# Patient Record
Sex: FEMALE | Race: WHITE | HISPANIC OR LATINO | URBAN - METROPOLITAN AREA
[De-identification: names, ages, dates, MRNs, and addresses within clinical notes are randomized per-mention and may not be internally consistent; named-entity substitution may affect disease eponyms.]

---

## 2021-08-23 ENCOUNTER — IMMUNIZATION (OUTPATIENT)
Dept: PRIMARY CARE CLINIC | Facility: CLINIC | Age: 46
End: 2021-08-23

## 2021-08-23 DIAGNOSIS — Z23 NEED FOR VACCINATION: Primary | ICD-10-CM

## 2021-08-23 PROCEDURE — 0001A COVID-19, MRNA, LNP-S, PF, 30 MCG/0.3 ML DOSE VACCINE: ICD-10-PCS | Mod: CV19,S$GLB,, | Performed by: FAMILY MEDICINE

## 2021-08-23 PROCEDURE — 0001A COVID-19, MRNA, LNP-S, PF, 30 MCG/0.3 ML DOSE VACCINE: CPT | Mod: CV19,S$GLB,, | Performed by: FAMILY MEDICINE

## 2021-08-23 PROCEDURE — 91300 COVID-19, MRNA, LNP-S, PF, 30 MCG/0.3 ML DOSE VACCINE: CPT | Mod: S$GLB,,, | Performed by: FAMILY MEDICINE

## 2021-08-23 PROCEDURE — 91300 COVID-19, MRNA, LNP-S, PF, 30 MCG/0.3 ML DOSE VACCINE: ICD-10-PCS | Mod: S$GLB,,, | Performed by: FAMILY MEDICINE

## 2021-09-13 ENCOUNTER — IMMUNIZATION (OUTPATIENT)
Dept: PRIMARY CARE CLINIC | Facility: CLINIC | Age: 46
End: 2021-09-13

## 2021-09-13 DIAGNOSIS — Z23 NEED FOR VACCINATION: Primary | ICD-10-CM

## 2021-09-13 PROCEDURE — 91300 COVID-19, MRNA, LNP-S, PF, 30 MCG/0.3 ML DOSE VACCINE: CPT | Mod: S$GLB,,, | Performed by: FAMILY MEDICINE

## 2021-09-13 PROCEDURE — 0002A COVID-19, MRNA, LNP-S, PF, 30 MCG/0.3 ML DOSE VACCINE: CPT | Mod: CV19,S$GLB,, | Performed by: FAMILY MEDICINE

## 2021-09-13 PROCEDURE — 0002A COVID-19, MRNA, LNP-S, PF, 30 MCG/0.3 ML DOSE VACCINE: ICD-10-PCS | Mod: CV19,S$GLB,, | Performed by: FAMILY MEDICINE

## 2021-09-13 PROCEDURE — 91300 COVID-19, MRNA, LNP-S, PF, 30 MCG/0.3 ML DOSE VACCINE: ICD-10-PCS | Mod: S$GLB,,, | Performed by: FAMILY MEDICINE

## 2025-06-20 ENCOUNTER — HOSPITAL ENCOUNTER (EMERGENCY)
Facility: HOSPITAL | Age: 50
Discharge: HOME | End: 2025-06-21
Attending: EMERGENCY MEDICINE

## 2025-06-20 DIAGNOSIS — K80.20 CALCULUS OF GALLBLADDER WITHOUT CHOLECYSTITIS WITHOUT OBSTRUCTION: ICD-10-CM

## 2025-06-20 DIAGNOSIS — K76.0 FATTY LIVER: Primary | ICD-10-CM

## 2025-06-20 DIAGNOSIS — R10.13 EPIGASTRIC ABDOMINAL PAIN: ICD-10-CM

## 2025-06-20 LAB
ABSOLUTE EOSINOPHIL (OHS): 0.21 K/UL
ABSOLUTE MONOCYTE (OHS): 0.41 K/UL (ref 0.3–1)
ABSOLUTE NEUTROPHIL COUNT (OHS): 8.47 K/UL (ref 1.8–7.7)
ALBUMIN SERPL BCP-MCNC: 3.6 G/DL (ref 3.5–5.2)
ALP SERPL-CCNC: 98 UNIT/L (ref 40–150)
ALT SERPL W/O P-5'-P-CCNC: 27 UNIT/L (ref 10–44)
ANION GAP (OHS): 15 MMOL/L (ref 8–16)
AST SERPL-CCNC: 39 UNIT/L (ref 11–45)
B-HCG UR QL: NEGATIVE
BASOPHILS # BLD AUTO: 0.04 K/UL
BASOPHILS NFR BLD AUTO: 0.4 %
BILIRUB SERPL-MCNC: 0.3 MG/DL (ref 0.1–1)
BILIRUB UR QL STRIP.AUTO: NEGATIVE
BUN SERPL-MCNC: 22 MG/DL (ref 6–20)
CALCIUM SERPL-MCNC: 9 MG/DL (ref 8.7–10.5)
CHLORIDE SERPL-SCNC: 101 MMOL/L (ref 95–110)
CLARITY UR: CLEAR
CO2 SERPL-SCNC: 20 MMOL/L (ref 23–29)
COLOR UR AUTO: YELLOW
CREAT SERPL-MCNC: 0.8 MG/DL (ref 0.5–1.4)
ERYTHROCYTE [DISTWIDTH] IN BLOOD BY AUTOMATED COUNT: 13.2 % (ref 11.5–14.5)
GFR SERPLBLD CREATININE-BSD FMLA CKD-EPI: >60 ML/MIN/1.73/M2
GLUCOSE SERPL-MCNC: 158 MG/DL (ref 70–110)
GLUCOSE UR QL STRIP: NEGATIVE
HCT VFR BLD AUTO: 40.9 % (ref 37–48.5)
HCV AB SERPL QL IA: NEGATIVE
HGB BLD-MCNC: 13.7 GM/DL (ref 12–16)
HGB UR QL STRIP: NEGATIVE
HIV 1+2 AB+HIV1 P24 AG SERPL QL IA: NEGATIVE
HOLD SPECIMEN: NORMAL
HOLD SPECIMEN: NORMAL
IMM GRANULOCYTES # BLD AUTO: 0.03 K/UL (ref 0–0.04)
IMM GRANULOCYTES NFR BLD AUTO: 0.3 % (ref 0–0.5)
KETONES UR QL STRIP: NEGATIVE
LEUKOCYTE ESTERASE UR QL STRIP: NEGATIVE
LIPASE SERPL-CCNC: 29 U/L (ref 4–60)
LYMPHOCYTES # BLD AUTO: 1.65 K/UL (ref 1–4.8)
MCH RBC QN AUTO: 30.2 PG (ref 27–31)
MCHC RBC AUTO-ENTMCNC: 33.5 G/DL (ref 32–36)
MCV RBC AUTO: 90 FL (ref 82–98)
NITRITE UR QL STRIP: NEGATIVE
NUCLEATED RBC (/100WBC) (OHS): 0 /100 WBC
PH UR STRIP: 7 [PH]
PLATELET # BLD AUTO: 238 K/UL (ref 150–450)
PMV BLD AUTO: 11 FL (ref 9.2–12.9)
POTASSIUM SERPL-SCNC: 4.2 MMOL/L (ref 3.5–5.1)
PROT SERPL-MCNC: 8.1 GM/DL (ref 6–8.4)
PROT UR QL STRIP: ABNORMAL
RBC # BLD AUTO: 4.53 M/UL (ref 4–5.4)
RELATIVE EOSINOPHIL (OHS): 1.9 %
RELATIVE LYMPHOCYTE (OHS): 15.3 % (ref 18–48)
RELATIVE MONOCYTE (OHS): 3.8 % (ref 4–15)
RELATIVE NEUTROPHIL (OHS): 78.3 % (ref 38–73)
SODIUM SERPL-SCNC: 136 MMOL/L (ref 136–145)
SP GR UR STRIP: 1.03
UROBILINOGEN UR STRIP-ACNC: NEGATIVE EU/DL
WBC # BLD AUTO: 10.81 K/UL (ref 3.9–12.7)

## 2025-06-20 PROCEDURE — 87389 HIV-1 AG W/HIV-1&-2 AB AG IA: CPT | Performed by: EMERGENCY MEDICINE

## 2025-06-20 PROCEDURE — 83690 ASSAY OF LIPASE: CPT | Performed by: NURSE PRACTITIONER

## 2025-06-20 PROCEDURE — 86803 HEPATITIS C AB TEST: CPT | Performed by: EMERGENCY MEDICINE

## 2025-06-20 PROCEDURE — 96375 TX/PRO/DX INJ NEW DRUG ADDON: CPT

## 2025-06-20 PROCEDURE — 99285 EMERGENCY DEPT VISIT HI MDM: CPT | Mod: 25

## 2025-06-20 PROCEDURE — 81025 URINE PREGNANCY TEST: CPT | Performed by: NURSE PRACTITIONER

## 2025-06-20 PROCEDURE — 96374 THER/PROPH/DIAG INJ IV PUSH: CPT

## 2025-06-20 PROCEDURE — 63600175 PHARM REV CODE 636 W HCPCS: Performed by: NURSE PRACTITIONER

## 2025-06-20 PROCEDURE — 93005 ELECTROCARDIOGRAM TRACING: CPT

## 2025-06-20 PROCEDURE — 85025 COMPLETE CBC W/AUTO DIFF WBC: CPT | Performed by: NURSE PRACTITIONER

## 2025-06-20 PROCEDURE — 80053 COMPREHEN METABOLIC PANEL: CPT | Performed by: NURSE PRACTITIONER

## 2025-06-20 PROCEDURE — 93010 ELECTROCARDIOGRAM REPORT: CPT | Mod: ,,, | Performed by: INTERNAL MEDICINE

## 2025-06-20 PROCEDURE — 81003 URINALYSIS AUTO W/O SCOPE: CPT | Performed by: NURSE PRACTITIONER

## 2025-06-20 PROCEDURE — 25500020 PHARM REV CODE 255: Performed by: NURSE PRACTITIONER

## 2025-06-20 RX ORDER — CHLORTHALIDONE 25 MG/1
1 TABLET ORAL
COMMUNITY
Start: 2024-10-13

## 2025-06-20 RX ORDER — LEVOTHYROXINE SODIUM 125 UG/1
1 TABLET ORAL
COMMUNITY
Start: 2024-08-28

## 2025-06-20 RX ORDER — ATENOLOL 50 MG/1
1 TABLET ORAL
COMMUNITY
Start: 2024-10-13

## 2025-06-20 RX ORDER — LOSARTAN POTASSIUM 50 MG/1
1 TABLET ORAL
COMMUNITY
Start: 2024-10-13

## 2025-06-20 RX ORDER — MORPHINE SULFATE 4 MG/ML
4 INJECTION, SOLUTION INTRAMUSCULAR; INTRAVENOUS
Refills: 0 | Status: COMPLETED | OUTPATIENT
Start: 2025-06-20 | End: 2025-06-20

## 2025-06-20 RX ORDER — ONDANSETRON HYDROCHLORIDE 2 MG/ML
4 INJECTION, SOLUTION INTRAVENOUS
Status: COMPLETED | OUTPATIENT
Start: 2025-06-20 | End: 2025-06-20

## 2025-06-20 RX ADMIN — MORPHINE SULFATE 4 MG: 4 INJECTION INTRAVENOUS at 10:06

## 2025-06-20 RX ADMIN — IOHEXOL 100 ML: 350 INJECTION, SOLUTION INTRAVENOUS at 10:06

## 2025-06-20 RX ADMIN — ONDANSETRON 4 MG: 2 INJECTION INTRAMUSCULAR; INTRAVENOUS at 10:06

## 2025-06-20 NOTE — FIRST PROVIDER EVALUATION
"Medical screening examination initiated.  I have conducted a focused provider triage encounter, findings are as follows:    Brief history of present illness:  49-year-old female who presents to ER complaining of right upper quadrant pain that radiates to right upper back that started this morning.  Describes pain as sharp and constant in nature.  Reports nothing makes pain better or worse.  Reports no relief with Tylenol.    Vitals:    06/20/25 1556   BP: (!) 183/86   BP Location: Right forearm   Pulse: (!) 57   Resp: 16   Temp: 97.9 °F (36.6 °C)   TempSrc: Oral   SpO2: 97%   Weight: 109.1 kg (240 lb 9.6 oz)   Height: 5' 1" (1.549 m)       Pertinent physical exam:  Right upper quadrant tenderness    Brief workup plan:  Workup, further evaluation    Preliminary workup initiated; this workup will be continued and followed by the physician or advanced practice provider that is assigned to the patient when roomed.  "

## 2025-06-21 VITALS
RESPIRATION RATE: 18 BRPM | HEART RATE: 61 BPM | SYSTOLIC BLOOD PRESSURE: 134 MMHG | HEIGHT: 61 IN | OXYGEN SATURATION: 94 % | BODY MASS INDEX: 45.43 KG/M2 | TEMPERATURE: 98 F | DIASTOLIC BLOOD PRESSURE: 72 MMHG | WEIGHT: 240.63 LBS

## 2025-06-21 LAB
OHS QRS DURATION: 78 MS
OHS QTC CALCULATION: 579 MS

## 2025-06-21 PROCEDURE — 96375 TX/PRO/DX INJ NEW DRUG ADDON: CPT

## 2025-06-21 PROCEDURE — 25000003 PHARM REV CODE 250: Performed by: EMERGENCY MEDICINE

## 2025-06-21 PROCEDURE — 63600175 PHARM REV CODE 636 W HCPCS: Mod: JZ,TB | Performed by: EMERGENCY MEDICINE

## 2025-06-21 RX ORDER — KETOROLAC TROMETHAMINE 30 MG/ML
15 INJECTION, SOLUTION INTRAMUSCULAR; INTRAVENOUS
Status: COMPLETED | OUTPATIENT
Start: 2025-06-21 | End: 2025-06-21

## 2025-06-21 RX ORDER — ONDANSETRON 8 MG/1
8 TABLET, ORALLY DISINTEGRATING ORAL EVERY 8 HOURS PRN
Qty: 15 TABLET | Refills: 0 | Status: SHIPPED | OUTPATIENT
Start: 2025-06-21

## 2025-06-21 RX ORDER — KETOROLAC TROMETHAMINE 10 MG/1
10 TABLET, FILM COATED ORAL EVERY 6 HOURS PRN
Qty: 15 TABLET | Refills: 0 | Status: SHIPPED | OUTPATIENT
Start: 2025-06-21

## 2025-06-21 RX ORDER — HYDROCODONE BITARTRATE AND ACETAMINOPHEN 5; 325 MG/1; MG/1
1 TABLET ORAL EVERY 6 HOURS PRN
Qty: 12 TABLET | Refills: 0 | Status: SHIPPED | OUTPATIENT
Start: 2025-06-21

## 2025-06-21 RX ORDER — HYDROCODONE BITARTRATE AND ACETAMINOPHEN 5; 325 MG/1; MG/1
1 TABLET ORAL
Refills: 0 | Status: COMPLETED | OUTPATIENT
Start: 2025-06-21 | End: 2025-06-21

## 2025-06-21 RX ADMIN — KETOROLAC TROMETHAMINE 15 MG: 30 INJECTION, SOLUTION INTRAMUSCULAR at 03:06

## 2025-06-21 RX ADMIN — HYDROCODONE BITARTRATE AND ACETAMINOPHEN 1 TABLET: 5; 325 TABLET ORAL at 03:06

## 2025-06-21 NOTE — ED PROVIDER NOTES
SCRIBE #1 NOTE: I, Kevin Carter, am scribing for, and in the presence of, Domenico Montgomery MD. I have scribed the entire note.       History     Chief Complaint   Patient presents with    Abdominal Pain     Patient presents to ED with c/o generalized abdominal pain that radiates towards her back, and diaphoresis that started today.     Review of patient's allergies indicates:  No Known Allergies      History of Present Illness     HPI    2025, 12:13 AM  History obtained from the patient and .  also interpreted for the patient.       History of Present Illness: Dipti Marques is a 49 y.o. female patient who presents to the Emergency Department for evaluation of RUQ abdominal pain which onset gradually at 5 AM this morning. She states her pain radiates to her back. Patient states she has had this pain 3 times previously which she took Tylenol for relief. She reports she has had a  in the past and denies any other abdominal surgeries. She last ate this morning. Symptoms are constant and moderate in severity. No mitigating or exacerbating factors reported. Associated sxs include N/V. Patient denies any diarrhea, fever, chills, dysuria, hematuria, and all other sxs at this time. Prior Tx includes Tylenol. No further complaints or concerns at this time.       Arrival mode: Personal vehicle    PCP: No primary care provider on file.        Past Medical History:  History reviewed. No pertinent past medical history.    Past Surgical History:  History reviewed. No pertinent surgical history.      Family History:  No family history on file.    Social History:  Social History     Tobacco Use    Smoking status: Never    Smokeless tobacco: Never   Vaping Use    Vaping status: Not on file   Substance and Sexual Activity    Alcohol use: Not on file    Drug use: Not on file    Sexual activity: Not on file        Review of Systems     Review of Systems   Constitutional:  Negative for fever.   HENT:  Negative  "for sore throat.    Respiratory:  Negative for shortness of breath.    Cardiovascular:  Negative for chest pain.   Gastrointestinal:  Positive for abdominal pain (RUQ), nausea and vomiting.   Genitourinary:  Negative for dysuria and hematuria.   Musculoskeletal:  Negative for back pain.   Skin:  Negative for rash.   Neurological:  Negative for weakness.   Hematological:  Does not bruise/bleed easily.   All other systems reviewed and are negative.       Physical Exam     Initial Vitals [06/20/25 1556]   BP Pulse Resp Temp SpO2   (!) 183/86 (!) 57 16 97.9 °F (36.6 °C) 97 %      MAP       --          Physical Exam   Nursing Notes and Vital Signs Reviewed.  Constitutional: Patient is in no acute distress. Increased BMI  Head: Atraumatic. Normocephalic.  Eyes: PERRL. EOM intact. Conjunctivae are not pale. No scleral icterus.  ENT: Mucous membranes are moist.   Neck: Supple. Full ROM.   Cardiovascular: Regular rate. Regular rhythm. No murmurs, rubs, or gallops. Distal pulses are 2+ and symmetric.  Pulmonary/Chest: No respiratory distress. Clear to auscultation bilaterally. No wheezing or rales.  Abdominal: Soft and non-distended.  There is RUQ tenderness.  No rebound, guarding, or rigidity.  Genitourinary: No CVA tenderness  Musculoskeletal: Moves all extremities. No obvious deformities. No edema.   Skin: Warm and dry.  Neurological:  Alert, awake, and appropriate.  Normal speech.  No acute focal neurological deficits are appreciated.  Psychiatric: Normal affect. Good eye contact. Appropriate in content.     ED Course   Procedures  ED Vital Signs:  Vitals:    06/20/25 1556 06/20/25 2156 06/20/25 2221 06/20/25 2300   BP: (!) 183/86 (!) 186/99  (!) 146/74   Pulse: (!) 57 (!) 57  (!) 59   Resp: 16 16 16    Temp: 97.9 °F (36.6 °C)      TempSrc: Oral      SpO2: 97% 96%  (!) 94%   Weight: 109.1 kg (240 lb 9.6 oz)      Height: 5' 1" (1.549 m)       06/20/25 2330 06/20/25 2355 06/21/25 0000 06/21/25 0030   BP: (!) 154/81 (!) " 154/81 (!) 150/75 (!) 157/77   Pulse: 61 60 61 64   Resp: (!) 21 20 19 17   Temp:       TempSrc:       SpO2: (!) 94%  95% 95%   Weight:       Height:        06/21/25 0100 06/21/25 0130   BP: (!) 151/76 136/66   Pulse: 63 61   Resp: 20 (!) 23   Temp:     TempSrc:     SpO2: (!) 94% 95%   Weight:     Height:         Abnormal Lab Results:  Labs Reviewed   COMPREHENSIVE METABOLIC PANEL - Abnormal       Result Value    Sodium 136      Potassium 4.2      Chloride 101      CO2 20 (*)     Glucose 158 (*)     BUN 22 (*)     Creatinine 0.8      Calcium 9.0      Protein Total 8.1      Albumin 3.6      Bilirubin Total 0.3      ALP 98      AST 39      ALT 27      Anion Gap 15      eGFR >60     URINALYSIS, REFLEX TO URINE CULTURE - Abnormal    Color, UA Yellow      Appearance, UA Clear      pH, UA 7.0      Spec Grav UA 1.030      Protein, UA Trace (*)     Glucose, UA Negative      Ketones, UA Negative      Bilirubin, UA Negative      Blood, UA Negative      Nitrites, UA Negative      Urobilinogen, UA Negative      Leukocyte Esterase, UA Negative     CBC WITH DIFFERENTIAL - Abnormal    WBC 10.81      RBC 4.53      HGB 13.7      HCT 40.9      MCV 90      MCH 30.2      MCHC 33.5      RDW 13.2      Platelet Count 238      MPV 11.0      Nucleated RBC 0      Neut % 78.3 (*)     Lymph % 15.3 (*)     Mono % 3.8 (*)     Eos % 1.9      Basophil % 0.4      Imm Grans % 0.3      Neut # 8.47 (*)     Lymph # 1.65      Mono # 0.41      Eos # 0.21      Baso # 0.04      Imm Grans # 0.03     HEPATITIS C ANTIBODY - Normal    Hep C Ab Interp Negative     HIV 1 / 2 ANTIBODY - Normal    HIV 1/2 Ag/Ab Negative     LIPASE - Normal    Lipase Level 29     PREGNANCY TEST, URINE RAPID - Normal    hCG Qualitative, Urine Negative     HEP C VIRUS HOLD SPECIMEN    Extra Tube Hold for add-ons.     CBC W/ AUTO DIFFERENTIAL    Narrative:     The following orders were created for panel order CBC W/ AUTO DIFFERENTIAL.  Procedure                                Abnormality         Status                     ---------                               -----------         ------                     CBC with Differential[[id number]]       Abnormal            Final result                 Please view results for these tests on the individual orders.   GREY TOP URINE HOLD    Extra Tube Hold for add-ons.          All Lab Results:  Results for orders placed or performed during the hospital encounter of 06/20/25   Urinalysis, Reflex to Urine Culture Urine, Clean Catch    Collection Time: 06/20/25  4:14 PM    Specimen: Urine   Result Value Ref Range    Color, UA Yellow Straw, Ana Laura, Yellow, Light-Orange    Appearance, UA Clear Clear    pH, UA 7.0 5.0 - 8.0    Spec Grav UA 1.030 1.005 - 1.030    Protein, UA Trace (A) Negative    Glucose, UA Negative Negative    Ketones, UA Negative Negative    Bilirubin, UA Negative Negative    Blood, UA Negative Negative    Nitrites, UA Negative Negative    Urobilinogen, UA Negative <2.0 EU/dL    Leukocyte Esterase, UA Negative Negative   Pregnancy, urine rapid    Collection Time: 06/20/25  4:14 PM   Result Value Ref Range    hCG Qualitative, Urine Negative Negative   GREY [place] URINE HOLD    Collection Time: 06/20/25  4:14 PM   Result Value Ref Range    Extra Tube Hold for add-ons.    Hepatitis C Antibody    Collection Time: 06/20/25  4:43 PM   Result Value Ref Range    Hep C Ab Interp Negative Negative   HCV Virus Hold Specimen    Collection Time: 06/20/25  4:43 PM   Result Value Ref Range    Extra Tube Hold for add-ons.    HIV 1/2 Ag/Ab (4th Gen)    Collection Time: 06/20/25  4:43 PM   Result Value Ref Range    HIV 1/2 Ag/Ab Negative Negative   Comp. Metabolic Panel    Collection Time: 06/20/25  4:43 PM   Result Value Ref Range    Sodium 136 136 - 145 mmol/L    Potassium 4.2 3.5 - 5.1 mmol/L    Chloride 101 95 - 110 mmol/L    CO2 20 (L) 23 - 29 mmol/L    Glucose 158 (H) 70 - 110 mg/dL    BUN 22 (H) 6 - 20 mg/dL    Creatinine 0.8 0.5 - 1.4 mg/dL     Calcium 9.0 8.7 - 10.5 mg/dL    Protein Total 8.1 6.0 - 8.4 gm/dL    Albumin 3.6 3.5 - 5.2 g/dL    Bilirubin Total 0.3 0.1 - 1.0 mg/dL    ALP 98 40 - 150 unit/L    AST 39 11 - 45 unit/L    ALT 27 10 - 44 unit/L    Anion Gap 15 8 - 16 mmol/L    eGFR >60 >60 mL/min/1.73/m2   Lipase    Collection Time: 06/20/25  4:43 PM   Result Value Ref Range    Lipase Level 29 4 - 60 U/L   CBC with Differential    Collection Time: 06/20/25  4:43 PM   Result Value Ref Range    WBC 10.81 3.90 - 12.70 K/uL    RBC 4.53 4.00 - 5.40 M/uL    HGB 13.7 12.0 - 16.0 gm/dL    HCT 40.9 37.0 - 48.5 %    MCV 90 82 - 98 fL    MCH 30.2 27.0 - 31.0 pg    MCHC 33.5 32.0 - 36.0 g/dL    RDW 13.2 11.5 - 14.5 %    Platelet Count 238 150 - 450 K/uL    MPV 11.0 9.2 - 12.9 fL    Nucleated RBC 0 <=0 /100 WBC    Neut % 78.3 (H) 38 - 73 %    Lymph % 15.3 (L) 18 - 48 %    Mono % 3.8 (L) 4 - 15 %    Eos % 1.9 <=8 %    Basophil % 0.4 <=1.9 %    Imm Grans % 0.3 0.0 - 0.5 %    Neut # 8.47 (H) 1.8 - 7.7 K/uL    Lymph # 1.65 1 - 4.8 K/uL    Mono # 0.41 0.3 - 1 K/uL    Eos # 0.21 <=0.5 K/uL    Baso # 0.04 <=0.2 K/uL    Imm Grans # 0.03 0.00 - 0.04 K/uL         Imaging Results:  Imaging Results              CT Abdomen Pelvis With IV Contrast NO Oral Contrast (Final result)  Result time 06/20/25 22:36:53      Final result by Gelacio Walden III, MD (06/20/25 22:36:53)                   Narrative:    EXAM: CT ABDOMEN PELVIS WITH IV CONTRAST    CLINICAL HISTORY:  Epigastric pain;    TECHNIQUE: Standard axial imaging of the abdomen and pelvis performed with IV contrast material. Reformatted 2-D sagittal and coronal images obtained.    All CT scans at this location are performed using dose modulation techniques as appropriate to a performed exam including the following: automated exposure control; adjustment of the mA and/or kV according to patient size (this includes techniques or standardized protocols for targeted exams where dose is matched to indication / reason  for exam; i.e. extremities or head); use of iterative reconstruction technique.    COMPARISON: None    FINDINGS:    Mild atelectasis posterior right lung base.  Left lung base clear.  Visualized heart with mild coronary artery calcification.    Fatty liver solitary subcentimeter calcified gallstones.  Negative for acute cholecystitis. Normal gallbladder.    Normal spleen. Normal pancreas. Normal adrenal glands. Normal caliber aorta and iliac arteries.  IVC normal.  No retroperitoneal adenopathy.    Kidneys and ureters are normal bilaterally.    Normal stomach. Normal small intestine. Normal appendix. Normal colon.    Anteverted uterus with a fibroid on the right side measuring 3.5 cm.  Normal ovaries.  Mildly distended normal-appearing bladder.    No acute or suspicious bony abnormality.    IMPRESSION     Negative for acute inflammatory process of the abdomen or pelvis.    Fatty liver.  Cholelithiasis.    3.5 cm right-sided uterine fibroid.              Finalized on: 6/20/2025 10:36 PM By:  Gelacio Walden MD  [facility]# [id number]      2025-06-20 22:38:[id number]     [facility]                                     The EKG was ordered, reviewed, and independently interpreted by the ED provider.  Interpretation time: 22:59  Rate: 56 BPM  Rhythm: sinus bradycardia  Interpretation: T wave abnormality, consider inferior ischemia. No STEMI.    The Emergency Provider reviewed the vital signs and test results, which are outlined above.     ED Discussion       2:30 AM: Reassessed pt at this time. Discussed with pt all pertinent ED information and results. Discussed pt dx and plan of tx. Gave pt all f/u and return to the ED instructions. All questions and concerns were addressed at this time. Pt expresses understanding of information and instructions, and is comfortable with plan to discharge. Pt is stable for discharge.    I discussed with patient and/or family/caretaker that evaluation in the ED does not suggest any emergent or life  threatening medical conditions requiring immediate intervention beyond what was provided in the ED, and I believe patient is safe for discharge.  Regardless, an unremarkable evaluation in the ED does not preclude the development or presence of a serious of life threatening condition. As such, patient was instructed to return immediately for any worsening or change in current symptoms.         Medical Decision Making  49-year-old female with right upper quadrant pain.  Found to have fatty liver and cholelithiasis without evidence of cholecystitis.  Normal labs.  Afebrile.  Normal white blood cell count.  Treated symptomatically.  Discussed treatment for fatty liver.  Discussed outpatient follow up with a general surgeon to discuss treatment options for cholelithiasis.  ER return precautions provided    Amount and/or Complexity of Data Reviewed  Independent Historian: spouse     Details:  at bedside provided additional history as well as interpreted for the patient.  External Data Reviewed: notes.     Details: Past medical history, medications, allergies reviewed  Labs: ordered. Decision-making details documented in ED Course.  Radiology: ordered and independent interpretation performed. Decision-making details documented in ED Course.  ECG/medicine tests: ordered and independent interpretation performed. Decision-making details documented in ED Course.    Risk  OTC drugs.  Prescription drug management.  Parenteral controlled substances.                 ED Medication(s):  Medications   ketorolac injection 15 mg (has no administration in time range)   HYDROcodone-acetaminophen 5-325 mg per tablet 1 tablet (has no administration in time range)   morphine injection 4 mg (4 mg Intravenous Given 6/20/25 2221)   ondansetron injection 4 mg (4 mg Intravenous Given 6/20/25 2217)   iohexoL (OMNIPAQUE 350) injection 100 mL (100 mLs Intravenous Given 6/20/25 2209)       New Prescriptions    HYDROCODONE-ACETAMINOPHEN  (NORCO) 5-325 MG PER TABLET    Take 1 tablet by mouth every 6 (six) hours as needed for Pain.    KETOROLAC (TORADOL) 10 MG TABLET    Take 1 tablet (10 mg total) by mouth every 6 (six) hours as needed for Pain.    ONDANSETRON (ZOFRAN-ODT) 8 MG TBDL    Take 1 tablet (8 mg total) by mouth every 8 (eight) hours as needed (nausea).        Follow-up Information       Schedule an appointment as soon as possible for a visit  with Yaya Yusuf MD.    Specialty: General Surgery  Contact information:  [address] [facility] [address] [phone]               [facility] - Emergency Dept..    Specialty: Emergency Medicine  Why: As needed, If symptoms worsen  Contact information:  [address] [facility] [address] [phone]                               Scribe Attestation:   Scribe #1: I performed the above scribed service and the documentation accurately describes the services I performed. I attest to the accuracy of the note.     Attending:   Physician Attestation Statement for Scribe #1: I, Domenico Montgomery MD, personally performed the services described in this documentation, as scribed by Kevin Carter, in my presence, and it is both accurate and complete.           Clinical Impression       ICD-10-CM ICD-9-CM   1. Fatty liver  K76.0 571.8   2. Epigastric abdominal pain  R10.13 789.06   3. Calculus of gallbladder without cholecystitis without obstruction  K80.20 574.20       Disposition:   Disposition: Discharged  Condition: Stable       Domenico Montgomery MD  06/21/25 [id number]